# Patient Record
Sex: MALE | Race: WHITE | NOT HISPANIC OR LATINO | URBAN - METROPOLITAN AREA
[De-identification: names, ages, dates, MRNs, and addresses within clinical notes are randomized per-mention and may not be internally consistent; named-entity substitution may affect disease eponyms.]

---

## 2022-11-23 ENCOUNTER — OFFICE VISIT (OUTPATIENT)
Dept: OBGYN CLINIC | Facility: CLINIC | Age: 62
End: 2022-11-23

## 2022-11-23 ENCOUNTER — APPOINTMENT (OUTPATIENT)
Dept: RADIOLOGY | Facility: CLINIC | Age: 62
End: 2022-11-23

## 2022-11-23 VITALS
WEIGHT: 196 LBS | DIASTOLIC BLOOD PRESSURE: 71 MMHG | SYSTOLIC BLOOD PRESSURE: 107 MMHG | HEART RATE: 58 BPM | HEIGHT: 70 IN | BODY MASS INDEX: 28.06 KG/M2

## 2022-11-23 DIAGNOSIS — M25.511 ACUTE PAIN OF RIGHT SHOULDER: ICD-10-CM

## 2022-11-23 DIAGNOSIS — M75.01 ADHESIVE CAPSULITIS OF RIGHT SHOULDER: Primary | ICD-10-CM

## 2022-11-23 RX ORDER — AMOXICILLIN 500 MG/1
CAPSULE ORAL
COMMUNITY
Start: 2022-08-30 | End: 2022-11-23 | Stop reason: ALTCHOICE

## 2022-11-23 RX ORDER — SODIUM FLUORIDE 6 MG/ML
PASTE, DENTIFRICE DENTAL DAILY
COMMUNITY
Start: 2022-11-06

## 2022-11-23 RX ORDER — TRIAMCINOLONE ACETONIDE 40 MG/ML
40 INJECTION, SUSPENSION INTRA-ARTICULAR; INTRAMUSCULAR
Status: COMPLETED | OUTPATIENT
Start: 2022-11-23 | End: 2022-11-23

## 2022-11-23 RX ADMIN — TRIAMCINOLONE ACETONIDE 40 MG: 40 INJECTION, SUSPENSION INTRA-ARTICULAR; INTRAMUSCULAR at 10:39

## 2022-11-23 NOTE — PATIENT INSTRUCTIONS
You have been diagnosed with frozen shoulder also noted as adhesive capsulitis  This is a condition of the shoulder that results in significant stiffness and is due to a thickening and inflammation of the capsule surrounding the ball and socket joint of the shoulder  The definitive treatment is home exercise program as well as physical therapy to help regain motion  Corticosteroid injections are helpful in alleviating pain associated with range of motion but are not curative  The curative conservative treatment is physical therapy in order to  stretch out the tightened capsule of the shoulder and regain motion  These home exercises must be performed daily  Unfortunately frozen shoulder does not see complete resolution for 6 months to 2 years  Certain groups of patient such as diabetics as well as those who have been diagnosed with thyroid disorders are at higher risk for developing frozen shoulder  If patients are failing conservative treatment with physical therapy as well as corticosteroid injections after 6+ months then sometimes surgical intervention is considered

## 2022-11-23 NOTE — PROGRESS NOTES
1  Adhesive capsulitis of right shoulder  Large joint arthrocentesis: R subacromial bursa    [facility]      2  Acute pain of right shoulder  XR shoulder 2+ vw right        Orders Placed This Encounter   Procedures   • Large joint arthrocentesis: R subacromial bursa   • XR shoulder 2+ vw right   • [facility]        IMAGING STUDIES: (I personally reviewed images in PACS and report):  Xray right shoulder 11/23/22:  No acute abnormality        PAST REPORTS:         ASSESSMENT/PLAN:  Right Shoulder frozen shoulder and impingement      Repeat X-ray next visit: None      Return for Follow-up for Ultrasound Guided Injection  Patient Instructions   You have been diagnosed with frozen shoulder also noted as adhesive capsulitis  This is a condition of the shoulder that results in significant stiffness and is due to a thickening and inflammation of the capsule surrounding the ball and socket joint of the shoulder  The definitive treatment is home exercise program as well as physical therapy to help regain motion  Corticosteroid injections are helpful in alleviating pain associated with range of motion but are not curative  The curative conservative treatment is physical therapy in order to  stretch out the tightened capsule of the shoulder and regain motion  These home exercises must be performed daily  Unfortunately frozen shoulder does not see complete resolution for 6 months to 2 years  Certain groups of patient such as diabetics as well as those who have been diagnosed with thyroid disorders are at higher risk for developing frozen shoulder  If patients are failing conservative treatment with physical therapy as well as corticosteroid injections after 6+ months then sometimes surgical intervention is considered            __________________________________________________________________________    HISTORY OF PRESENT ILLNESS:    Tears right shoulder pain with no specific injury event points to lateral aspect of the shoulder source of pain  Throbbing stabbing with motion moderate intensity worse with reaching overhead or pushing  No physical therapy  No other treatment  Review of Systems      Following history reviewed and update:    History reviewed  No pertinent past medical history  Past Surgical History:   Procedure Laterality Date   • BACK SURGERY     • ELBOW SURGERY     • KNEE ARTHROSCOPY W/ MENISCAL REPAIR     • SHOULDER SURGERY     • TONSILLECTOMY       Social History   Social History     Substance and Sexual Activity   Alcohol Use None     Social History     Substance and Sexual Activity   Drug Use Not on file     Social History     Tobacco Use   Smoking Status Not on file   Smokeless Tobacco Not on file     No family history on file  No Known Allergies       Physical Exam  /71 (BP Location: Right arm, Patient Position: Sitting, Cuff Size: Adult)   Pulse 58   Ht 5' 9 5" (1 765 m)   Wt 88 9 kg (196 lb)   BMI 28 53 kg/m²     Constitutional:  see vital signs  Gen: well-developed, normocephalic/atraumatic, well-groomed  Eyes: No inflammation or discharge of conjunctiva or lids; sclera clear   Pharynx: no inflammation, lesion, or mass of lips  Neck: supple, no masses, non-distended  MSK: no inflammation, lesion, mass, or clubbing of nails and digits except for other than mentioned below  SKIN: no visible rashes or skin lesions  Pulmonary/Chest: Effort normal  No respiratory distress     NEURO: cranial nerves grossly intact  PSYCH:  Alert and oriented to person, place, and time; recent and remote memory intact; mood normal, no depression, anxiety, or agitation, judgment and insight good and intact     Ortho Exam  RIGHT SHOULDER:  Erythema: no  Swelling: no  Increased Warmth: no    Tenderness: lateral subacromial    ROM  Abduction: 90  External Rotation: 45  Internal Rotation: 20    Strength  Abduction: 5/5  ER: 5/5  IR: 5/5    Drop-Arm: negative  Emptycan: +  Belly Press:   Lift-off Test:    Callum Sluder: +  Neer: +  Cross-Arm:   Speeds:     LEFT SHOULDER:  Strength  Abduction: 5/5  ER: 5/5  IR: 5/5    ROM  Touchdown sign: intact    Empty can: negative      __________________________________________________________________________  Large joint arthrocentesis: R subacromial bursa  Universal Protocol:  Consent: Verbal consent obtained  Risks and benefits: risks, benefits and alternatives were discussed  Consent given by: patient  Time out: Immediately prior to procedure a "time out" was called to verify the correct patient, procedure, equipment, support staff and site/side marked as required    Patient understanding: patient states understanding of the procedure being performed  Site marked: the operative site was marked  Patient identity confirmed: verbally with patient    Supporting Documentation  Indications: pain and diagnostic evaluation   Procedure Details  Location: shoulder - R subacromial bursa  Preparation: Patient was prepped and draped in the usual sterile fashion  Needle size: 22 G  Ultrasound guidance: no  Approach: posterolateral  Medications administered: 40 mg triamcinolone acetonide 40 mg/mL    Patient tolerance: patient tolerated the procedure well with no immediate complications  Dressing:  Sterile dressing applied    Naropin (Ropivacaine) 0 5%  74455-152-10  [id number]  01/26  4ml

## 2022-11-30 VITALS
BODY MASS INDEX: 27.54 KG/M2 | SYSTOLIC BLOOD PRESSURE: 118 MMHG | DIASTOLIC BLOOD PRESSURE: 74 MMHG | WEIGHT: 192.4 LBS | HEART RATE: 49 BPM | HEIGHT: 70 IN

## 2022-11-30 DIAGNOSIS — M75.01 ADHESIVE CAPSULITIS OF RIGHT SHOULDER: Primary | ICD-10-CM

## 2022-11-30 NOTE — PROGRESS NOTES
1  Adhesive capsulitis of right shoulder          Orders Placed This Encounter   Procedures   • Large joint arthrocentesis        IMAGING STUDIES: (I personally reviewed images in PACS and report):         PAST REPORTS:        ASSESSMENT/PLAN:  Right Shoulder frozen shoulder and impingement       Repeat X-ray next visit: None    Return in about 2 months (around 1/30/2023)  Patient Instructions   Begin physical therapy if not already  red flags and risks of injection include but are not limited to infection <0 072% as referenced in some sources, nerve or artery penetration, and if steroids are used-skin dimpling <1%, hypo-pigmentation <1%  Recommended no submerging underwater in a tub, pool, ocean, lake, jacuzzi, hot tub, or any other body of water for 1 week until needle wound closes due to risk of infection  May take showers  Clean needle site with soap and water and keep covered at all times with sterile bandage such as a band-aid until fully healed  Educated if any symptoms including fevers, chills, swelling, or worsening symptoms occur then to call office or go to hospital for immediate care if physician unavailable due to possible infection or other complication which is a serious medical problem  Patient expressed understanding and agreed to proceed with procedure               __________________________________________________________________________    HISTORY OF PRESENT ILLNESS:  Here for injection US right [address]          Review of Systems      Following history reviewed and update:    No past medical history on file    Past Surgical History:   Procedure Laterality Date   • BACK SURGERY     • ELBOW SURGERY     • KNEE ARTHROSCOPY W/ MENISCAL REPAIR     • SHOULDER SURGERY     • TONSILLECTOMY       Social History   Social History     Substance and Sexual Activity   Alcohol Use Not on file     Social History     Substance and Sexual Activity   Drug Use Not on file     Social History     Tobacco Use   Smoking Status Not on file   Smokeless Tobacco Not on file     No family history on file  No Known Allergies       Physical Exam  /74 (BP Location: Left arm, Patient Position: Sitting, Cuff Size: Adult)   Pulse (!) 49   Ht 5' 9 5" (1 765 m)   Wt 87 3 kg (192 lb 6 4 oz)   BMI 28 01 kg/m²       Ortho Exam    __________________________________________________________________________  Large joint arthrocentesis: R glenohumeral  [place] Protocol:  Consent: Verbal consent obtained  Risks and benefits: risks, benefits and alternatives were discussed  Consent given by: patient  Time out: Immediately prior to procedure a "time out" was called to verify the correct patient, procedure, equipment, support staff and site/side marked as required    Patient understanding: patient states understanding of the procedure being performed  Site marked: the operative site was marked  Patient identity confirmed: verbally with patient    Supporting Documentation  Indications: pain and diagnostic evaluation   Procedure Details  Location: shoulder - R glenohumeral  Preparation: Patient was prepped and draped in the usual sterile fashion  Needle size: 22 G  Ultrasound guidance: yes  Approach: posterior  Medications administered: 40 mg triamcinolone acetonide 40 mg/mL    Patient tolerance: patient tolerated the procedure well with no immediate complications  Dressing:  Sterile dressing applied    Naropin (Ropivacaine) 0 5%  99523-564-79  [id number]  01/26  4ml injectate  2ml needle anesthetic tract 25g

## 2022-11-30 NOTE — PATIENT INSTRUCTIONS
Begin physical therapy if not already  red flags and risks of injection include but are not limited to infection <0 072% as referenced in some sources, nerve or artery penetration, and if steroids are used-skin dimpling <1%, hypo-pigmentation <1%  Recommended no submerging underwater in a tub, pool, ocean, lake, jacuzzi, hot tub, or any other body of water for 1 week until needle wound closes due to risk of infection  May take showers  Clean needle site with soap and water and keep covered at all times with sterile bandage such as a band-aid until fully healed  Educated if any symptoms including fevers, chills, swelling, or worsening symptoms occur then to call office or go to hospital for immediate care if physician unavailable due to possible infection or other complication which is a serious medical problem  Patient expressed understanding and agreed to proceed with procedure

## 2022-12-12 RX ORDER — TRIAMCINOLONE ACETONIDE 40 MG/ML
40 INJECTION, SUSPENSION INTRA-ARTICULAR; INTRAMUSCULAR
Status: COMPLETED | OUTPATIENT
Start: 2022-12-12 | End: 2022-12-12

## 2022-12-12 RX ADMIN — TRIAMCINOLONE ACETONIDE 40 MG: 40 INJECTION, SUSPENSION INTRA-ARTICULAR; INTRAMUSCULAR at 12:19

## 2023-02-01 VITALS
HEIGHT: 70 IN | WEIGHT: 190 LBS | SYSTOLIC BLOOD PRESSURE: 124 MMHG | BODY MASS INDEX: 27.2 KG/M2 | DIASTOLIC BLOOD PRESSURE: 79 MMHG | HEART RATE: 62 BPM

## 2023-02-01 DIAGNOSIS — M75.01 ADHESIVE CAPSULITIS OF RIGHT SHOULDER: Primary | ICD-10-CM

## 2023-02-01 NOTE — PROGRESS NOTES
1  Adhesive capsulitis of right shoulder          No orders of the defined types were placed in this encounter  IMAGING STUDIES: (I personally reviewed images in PACS and report):         PAST REPORTS:         ASSESSMENT/PLAN:  Adhesive Capsulitis resolved    Repeat X-ray next visit: None    Return if symptoms worsen or fail to improve  Patient Instructions   Continue home exercise program          __________________________________________________________________________    HISTORY OF PRESENT ILLNESS:      F/u right shoulder adhesive capsulitis: resolved  Significant improvement after USG CSI  Review of Systems      Following history reviewed and update:    History reviewed  No pertinent past medical history  Past Surgical History:   Procedure Laterality Date   • BACK SURGERY     • ELBOW SURGERY     • KNEE ARTHROSCOPY W/ MENISCAL REPAIR     • SHOULDER SURGERY     • TONSILLECTOMY       Social History   Social History     Substance and Sexual Activity   Alcohol Use None     Social History     Substance and Sexual Activity   Drug Use Not on file     Social History     Tobacco Use   Smoking Status Never   Smokeless Tobacco Never     History reviewed  No pertinent family history  No Known Allergies       Physical Exam  /79   Pulse 62   Ht 5' 9 5" (1 765 m)   Wt 86 2 kg (190 lb)   BMI 27 66 kg/m²     Constitutional:  see vital signs  Gen: well-developed, normocephalic/atraumatic, well-groomed  Eyes: No inflammation or discharge of conjunctiva or lids; sclera clear   Pharynx: no inflammation, lesion, or mass of lips  Neck: supple, no masses, non-distended  MSK: no inflammation, lesion, mass, or clubbing of nails and digits except for other than mentioned below  SKIN: no visible rashes or skin lesions  Pulmonary/Chest: Effort normal  No respiratory distress     NEURO: cranial nerves grossly intact  PSYCH:  Alert and oriented to person, place, and time; recent and remote memory intact; mood normal, no depression, anxiety, or agitation, judgment and insight good and intact     Ortho Exam  RIGHT SHOULDER:    ROM  Touchdown sign: intact  External Rotation: Intact  Internal Rotation: Intact    Strength  Abduction: 5/5  ER: 5/5  IR: 5/5    Drop-Arm: negative  Emptycan: negative  Belly Press:   Lift-off Test:    Frances: negative  Neer: negative  Cross-Arm:   Speeds:       __________________________________________________________________________  Procedures